# Patient Record
Sex: FEMALE | Race: OTHER
[De-identification: names, ages, dates, MRNs, and addresses within clinical notes are randomized per-mention and may not be internally consistent; named-entity substitution may affect disease eponyms.]

---

## 2019-05-24 PROBLEM — Z00.00 ENCOUNTER FOR PREVENTIVE HEALTH EXAMINATION: Status: ACTIVE | Noted: 2019-05-24

## 2019-05-29 ENCOUNTER — APPOINTMENT (OUTPATIENT)
Dept: SPINE | Facility: CLINIC | Age: 54
End: 2019-05-29

## 2019-05-29 VITALS
HEIGHT: 61 IN | HEART RATE: 82 BPM | TEMPERATURE: 98.1 F | WEIGHT: 149 LBS | RESPIRATION RATE: 16 BRPM | SYSTOLIC BLOOD PRESSURE: 111 MMHG | DIASTOLIC BLOOD PRESSURE: 72 MMHG | BODY MASS INDEX: 28.13 KG/M2

## 2019-05-29 DIAGNOSIS — Z80.9 FAMILY HISTORY OF MALIGNANT NEOPLASM, UNSPECIFIED: ICD-10-CM

## 2019-05-29 DIAGNOSIS — Z78.9 OTHER SPECIFIED HEALTH STATUS: ICD-10-CM

## 2019-05-29 RX ORDER — CYCLOBENZAPRINE HCL 5 MG
5 TABLET ORAL 3 TIMES DAILY
Refills: 0 | Status: ACTIVE | COMMUNITY

## 2019-05-29 RX ORDER — NAPROXEN 500 MG/1
500 TABLET ORAL TWICE DAILY
Qty: 28 | Refills: 1 | Status: ACTIVE | COMMUNITY

## 2019-05-30 ENCOUNTER — APPOINTMENT (OUTPATIENT)
Dept: SPINE | Facility: CLINIC | Age: 54
End: 2019-05-30
Payer: MEDICAID

## 2019-05-30 ENCOUNTER — RESULT CHARGE (OUTPATIENT)
Age: 54
End: 2019-05-30

## 2019-05-30 ENCOUNTER — OTHER (OUTPATIENT)
Age: 54
End: 2019-05-30

## 2019-05-30 VITALS
BODY MASS INDEX: 27.94 KG/M2 | HEIGHT: 61 IN | WEIGHT: 148 LBS | SYSTOLIC BLOOD PRESSURE: 122 MMHG | DIASTOLIC BLOOD PRESSURE: 84 MMHG

## 2019-05-30 VITALS
BODY MASS INDEX: 27.94 KG/M2 | SYSTOLIC BLOOD PRESSURE: 122 MMHG | DIASTOLIC BLOOD PRESSURE: 84 MMHG | HEIGHT: 61 IN | WEIGHT: 148 LBS

## 2019-05-30 DIAGNOSIS — G95.0 SYRINGOMYELIA AND SYRINGOBULBIA: ICD-10-CM

## 2019-05-30 PROCEDURE — 99204 OFFICE O/P NEW MOD 45 MIN: CPT

## 2019-05-30 NOTE — ASSESSMENT
[FreeTextEntry1] : The patient is a 54-year-old female with a Chiari 1 malformation and a cervical-thoracic syrinx.

## 2019-05-30 NOTE — DATA REVIEWED
[de-identified] : MRI cervical/thoracic spine from [facility] vladislav Colvin 4/26/2019: s/p posterior fossa decompression, clivo-axial angle of 150 degrees, syrinx extending from C2-C3 to the conus medullaris which is predominately left sided in the cervical spine, C3-C6 herniated discs effacing the ventral spinal cord

## 2019-05-30 NOTE — PLAN
[FreeTextEntry1] : The patient should bring in her older MRIs of the cervical and thoracic spine for review, in order to assess whether or not her syrinx is getting bigger, getting smaller, or staying the same size. If her syrinx is worsening, I believe that the patient may benefit from a C3-C6 ACDF to relieve the pressure against her spinal cord, which may be resulting in continued obstruction of her central canal and the syrinx.

## 2019-05-30 NOTE — HISTORY OF PRESENT ILLNESS
[> 3 months] : more  than 3 months [FreeTextEntry1] : Neck pain [de-identified] : I had the pleasure of seeing Ms. Zulma Ugarte for an initial visit to my office today. Ms. Ugarte is a pleasant 54-year-old [place]ymra[place]-speaking female who previously had noted headaches and neck pain, was found to have a Chiari 1 malformation, and underwent a posterior fossa decompression at [facility] in 2008. However, postoperatively, her headaches and neck pain have persisted, and they are now associated with left upper extremity electrical shooting pain, numbness, and tingling radiating to all of her fingers and down her spinal axis. Due to the pain and paresthesias, the patient also notes difficulty using her left hand and coordination issues. The patient has tried physical therapy, with minimal to no relief. The patient has tried multiple injections, with  some temporary relief.

## 2019-05-30 NOTE — REASON FOR VISIT
[New Patient Visit] : a new patient visit [Family Member] : family member [Referred By: _________] : Patient was referred by [facility] [Other: _____] : [unfilled] [FreeTextEntry1] : Chiari Malformation and cervical-thoracic syrinx

## 2019-05-30 NOTE — REVIEW OF SYSTEMS
[Hand Weakness] :  hand weakness [Poor Coordination] : poor coordination [Numbness] : numbness [Tingling] : tingling [Abnormal Sensation] : an abnormal sensation [Migraine Headache] : migraine headaches [As Noted in HPI] : as noted in HPI [Arthralgias] : arthralgias [Joint Pain] : joint pain [Negative] : Heme/Lymph

## 2019-06-13 ENCOUNTER — APPOINTMENT (OUTPATIENT)
Dept: SPINE | Facility: CLINIC | Age: 54
End: 2019-06-13
Payer: MEDICAID

## 2019-06-13 VITALS
RESPIRATION RATE: 16 BRPM | HEART RATE: 88 BPM | HEIGHT: 61 IN | SYSTOLIC BLOOD PRESSURE: 117 MMHG | BODY MASS INDEX: 27.94 KG/M2 | WEIGHT: 148 LBS | TEMPERATURE: 97.9 F | DIASTOLIC BLOOD PRESSURE: 73 MMHG

## 2019-06-13 PROCEDURE — 99214 OFFICE O/P EST MOD 30 MIN: CPT

## 2019-06-13 NOTE — HISTORY OF PRESENT ILLNESS
[FreeTextEntry1] : I had the pleasure of seeing Ms. Zulma Ugarte for a follow-up visit to my office today. Ms. Ugarte is a pleasant 54-year-old [place]andan-speaking female who previously had noted headaches and neck pain, was found to have a Chiari 1 malformation, and underwent a posterior fossa decompression at [facility] in 2008. However, postoperatively, her headaches and neck pain have persisted, and they are now associated with left upper extremity electrical shooting pain, numbness, and tingling radiating to all of her fingers and down her spinal axis. Due to the pain and paresthesias, the patient also notes difficulty using her left hand and coordination issues. The patient has tried physical therapy, with minimal to no relief. The patient has tried multiple injections, with some temporary relief.

## 2019-06-13 NOTE — PHYSICAL EXAM
[General Appearance - Alert] : alert [General Appearance - In No Acute Distress] : in no acute distress [Oriented To Time, Place, And Person] : oriented to person, place, and time [Impaired Insight] : insight and judgment were intact [Affect] : the affect was normal [Person] : oriented to person [Place] : oriented to place [Time] : oriented to time [Short Term Intact] : short term memory intact [Remote Intact] : remote memory intact [Span Intact] : the attention span was normal [Concentration Intact] : normal concentrating ability [Fluency] : fluency intact [Comprehension] : comprehension intact [Current Events] : adequate knowledge of current events [Past History] : adequate knowledge of personal past history [Vocabulary] : adequate range of vocabulary [Cranial Nerves Optic (II)] : visual acuity intact bilaterally,  pupils equal round and reactive to light [Cranial Nerves Oculomotor (III)] : extraocular motion intact [Cranial Nerves Trigeminal (V)] : facial sensation intact symmetrically [Cranial Nerves Facial (VII)] : face symmetrical [Cranial Nerves Vestibulocochlear (VIII)] : hearing was intact bilaterally [Cranial Nerves Glossopharyngeal (IX)] : tongue and palate midline [Cranial Nerves Accessory (XI - Cranial And Spinal)] : head turning and shoulder shrug symmetric [Cranial Nerves Hypoglossal (XII)] : there was no tongue deviation with protrusion [Motor Strength] : muscle strength was normal in all four extremities [No Muscle Atrophy] : normal bulk in all four extremities [Sensation Tactile Decrease] : light touch was intact [Balance] : balance was intact [2+] : Patella left 2+ [No Visual Abnormalities] : no visible abnormailities [No Tenderness to Palpation] : no spine tenderness on palpation [Full ROM] : full ROM [No Pain with ROM] : no pain with motion in any direction [Normal] : normal [Intact] : all reflexes within normal limits bilaterally [Sclera] : the sclera and conjunctiva were normal [PERRL With Normal Accommodation] : pupils were equal in size, round, reactive to light, with normal accommodation [Extraocular Movements] : extraocular movements were intact [Outer Ear] : the ears and nose were normal in appearance [Oropharynx] : the oropharynx was normal [Neck Appearance] : the appearance of the neck was normal [Neck Cervical Mass (___cm)] : no neck mass was observed [Jugular Venous Distention Increased] : there was no jugular-venous distention [Thyroid Diffuse Enlargement] : the thyroid was not enlarged [Thyroid Nodule] : there were no palpable thyroid nodules [Auscultation Breath Sounds / Voice Sounds] : lungs were clear to auscultation bilaterally [Heart Rate And Rhythm] : heart rate was normal and rhythm regular [Heart Sounds] : normal S1 and S2 [Heart Sounds Gallop] : no gallops [Murmurs] : no murmurs [Heart Sounds Pericardial Friction Rub] : no pericardial rub [Full Pulse] : the pedal pulses are present [Edema] : there was no peripheral edema [Abdomen Soft] : soft [Bowel Sounds] : normal bowel sounds [Abdomen Tenderness] : non-tender [Abdomen Mass (___ Cm)] : no abdominal mass palpated [No CVA Tenderness] : no ~M costovertebral angle tenderness [No Spinal Tenderness] : no spinal tenderness [Abnormal Walk] : normal gait [Nail Clubbing] : no clubbing  or cyanosis of the fingernails [Musculoskeletal - Swelling] : no joint swelling seen [Motor Tone] : muscle strength and tone were normal [Skin Color & Pigmentation] : normal skin color and pigmentation [Skin Turgor] : normal skin turgor [] : no rash [Tremor] : no tremor present [Past-pointing] : there was no past-pointing [L'Hermitte's] : neck flexion did not produce tingling down the spine/arms [Spurling's Same Side] : Negative Spurling's on same side [Spurling's - Opposite Side] : Negative Spurling's on opposite side [Straight-Leg Raise Test - Left] : straight leg raise of the left leg was negative [Straight-Leg Raise Test - Right] : straight leg raise  of the right leg was negative

## 2019-06-13 NOTE — DATA REVIEWED
[de-identified] : MRI C/T-spine: no significant change in size of cervical thoracic syrinx from [id number] (no preoperative images available)

## 2019-06-13 NOTE — REASON FOR VISIT
[Follow-Up: _____] : a [unfilled] follow-up visit [Family Member] : family member [FreeTextEntry1] : The patient has a history of a Chiari 1 Malformation and underwent a posterior fossa decompression at [facility] in 2008.  She remains with headaches, neck pain and pain shooting down her arms with paresthesias.

## 2019-06-13 NOTE — ASSESSMENT
[FreeTextEntry1] : The patient is a 54-year-old female with a Chiari 1 malformation and a cervical-thoracic syrinx. The patient should try to obtain her preoperative MRIs of the cervical and thoracic spine for review, in order to assess whether or not her syrinx is getting bigger, getting smaller, or staying the same size after surgery. I will refer the patient to Pain Management for a cervical spinal cord stimulator trial. The patient should follow up with me after her cervical spinal cord stimulator trial for further discussion.

## 2019-06-13 NOTE — REVIEW OF SYSTEMS
[Hand Weakness] :  hand weakness [Poor Coordination] : poor coordination [Numbness] : numbness [Tingling] : tingling [Abnormal Sensation] : an abnormal sensation [Migraine Headache] : migraine headaches [As Noted in HPI] : as noted in HPI [Arthralgias] : arthralgias [Negative] : Heme/Lymph

## 2019-06-13 NOTE — CONSULT LETTER
[Sincerely,] : Sincerely, [Dear  ___] : Dear  [unfilled], [Please see my note below.] : Please see my note below. [Courtesy Letter:] : I had the pleasure of seeing your patient, [unfilled], in my office today. [FreeTextEntry2] : Santiago Fraire MD\par [address] [place] [address] [address]\[place] [address] [FreeTextEntry3] : Dante John,DO

## 2021-07-01 ENCOUNTER — TRANSCRIPTION ENCOUNTER (OUTPATIENT)
Age: 56
End: 2021-07-01

## 2022-08-05 ENCOUNTER — NON-APPOINTMENT (OUTPATIENT)
Age: 57
End: 2022-08-05